# Patient Record
Sex: MALE | Race: WHITE | URBAN - METROPOLITAN AREA
[De-identification: names, ages, dates, MRNs, and addresses within clinical notes are randomized per-mention and may not be internally consistent; named-entity substitution may affect disease eponyms.]

---

## 2022-03-03 ENCOUNTER — OFFICE VISIT (OUTPATIENT)
Dept: FAMILY MEDICINE CLINIC | Age: 27
End: 2022-03-03
Payer: OTHER GOVERNMENT

## 2022-03-03 VITALS
WEIGHT: 213 LBS | DIASTOLIC BLOOD PRESSURE: 87 MMHG | SYSTOLIC BLOOD PRESSURE: 130 MMHG | HEIGHT: 70 IN | HEART RATE: 88 BPM | TEMPERATURE: 98.7 F | BODY MASS INDEX: 30.49 KG/M2 | OXYGEN SATURATION: 97 % | RESPIRATION RATE: 16 BRPM

## 2022-03-03 DIAGNOSIS — H91.92 HEARING LOSS OF LEFT EAR, UNSPECIFIED HEARING LOSS TYPE: Primary | ICD-10-CM

## 2022-03-03 PROCEDURE — 99213 OFFICE O/P EST LOW 20 MIN: CPT | Performed by: STUDENT IN AN ORGANIZED HEALTH CARE EDUCATION/TRAINING PROGRAM

## 2022-03-03 SDOH — ECONOMIC STABILITY: FOOD INSECURITY: WITHIN THE PAST 12 MONTHS, THE FOOD YOU BOUGHT JUST DIDN'T LAST AND YOU DIDN'T HAVE MONEY TO GET MORE.: NEVER TRUE

## 2022-03-03 SDOH — ECONOMIC STABILITY: FOOD INSECURITY: WITHIN THE PAST 12 MONTHS, YOU WORRIED THAT YOUR FOOD WOULD RUN OUT BEFORE YOU GOT MONEY TO BUY MORE.: NEVER TRUE

## 2022-03-03 ASSESSMENT — ENCOUNTER SYMPTOMS
NAUSEA: 0
VOMITING: 0
CONSTIPATION: 0
COUGH: 0
EYE PAIN: 0
ABDOMINAL PAIN: 0
TROUBLE SWALLOWING: 0
SORE THROAT: 0
DIARRHEA: 0
PHOTOPHOBIA: 0
ABDOMINAL DISTENTION: 0
SHORTNESS OF BREATH: 0

## 2022-03-03 ASSESSMENT — PATIENT HEALTH QUESTIONNAIRE - PHQ9
1. LITTLE INTEREST OR PLEASURE IN DOING THINGS: 0
SUM OF ALL RESPONSES TO PHQ QUESTIONS 1-9: 0
SUM OF ALL RESPONSES TO PHQ QUESTIONS 1-9: 0
SUM OF ALL RESPONSES TO PHQ9 QUESTIONS 1 & 2: 0
2. FEELING DOWN, DEPRESSED OR HOPELESS: 0
SUM OF ALL RESPONSES TO PHQ QUESTIONS 1-9: 0
SUM OF ALL RESPONSES TO PHQ QUESTIONS 1-9: 0

## 2022-03-03 ASSESSMENT — SOCIAL DETERMINANTS OF HEALTH (SDOH): HOW HARD IS IT FOR YOU TO PAY FOR THE VERY BASICS LIKE FOOD, HOUSING, MEDICAL CARE, AND HEATING?: NOT HARD AT ALL

## 2022-03-03 NOTE — PROGRESS NOTES
3/3/2022    Isela Green is a 23 y.o. male here for: NTP    HPI:    Was in [place] [id number] for past 8 years, base was in [place] last  Has moved here now and still in [employer] and is a [employer]  He is  now  Has had COVID vaccine, no booster yet  Needs a full hearing test report from an audiology  Originally [place] and has family in [place]  Has 4 siblings and all healthy  Doesn't smoke or illicit drug use  occassionally alcohol use    Healthy and no concerns  Some lost in left ear for past 4 years  Needs an audiology referral for hearing      BP Readings from Last 3 Encounters:   03/03/22 130/87     No current outpatient medications on file. No current facility-administered medications for this visit. No Known Allergies    Past Medical & Surgical History:  History reviewed. No pertinent past medical history. History reviewed. No pertinent surgical history. Family History:      Problem Relation Age of Onset    Diabetes Maternal Grandmother        Social History:  Social History     Tobacco Use    Smoking status: Never Smoker    Smokeless tobacco: Never Used   Substance Use Topics    Alcohol use: Not Currently     Comment: occasionally         There is no immunization history on file for this patient. Review of Systems   Constitutional: Negative for activity change, appetite change, chills, fatigue and fever. HENT: Negative for congestion, sore throat and trouble swallowing. Eyes: Negative for photophobia, pain and visual disturbance. Respiratory: Negative for cough and shortness of breath. Cardiovascular: Negative for chest pain, palpitations and leg swelling. Gastrointestinal: Negative for abdominal distention, abdominal pain, constipation, diarrhea, nausea and vomiting. Endocrine: Negative for heat intolerance and polydipsia. Genitourinary: Negative for difficulty urinating, dysuria, frequency and hematuria.    Musculoskeletal: Negative for joint swelling, neck pain and neck stiffness. Skin: Negative for rash and wound. Neurological: Negative for dizziness, syncope, weakness, light-headedness, numbness and headaches. Psychiatric/Behavioral: Negative for agitation, behavioral problems and confusion. VS:  /87   Pulse 88   Temp 98.7 °F (37.1 °C) (Temporal)   Resp 16   Ht 5' 10\" (1.778 m)   Wt 213 lb (96.6 kg)   SpO2 97%   BMI 30.56 kg/m²     Physical Exam  Constitutional:       General: He is not in acute distress. Appearance: Normal appearance. He is not ill-appearing. HENT:      Head: Normocephalic and atraumatic. Right Ear: External ear normal.      Left Ear: External ear normal.      Nose: Nose normal. No congestion. Mouth/Throat:      Mouth: Mucous membranes are moist.      Pharynx: Oropharynx is clear. Eyes:      General:         Right eye: No discharge. Left eye: No discharge. Extraocular Movements: Extraocular movements intact. Conjunctiva/sclera: Conjunctivae normal.   Cardiovascular:      Rate and Rhythm: Normal rate and regular rhythm. Pulses: Normal pulses. Heart sounds: Normal heart sounds. No murmur heard. Pulmonary:      Effort: Pulmonary effort is normal. No respiratory distress. Breath sounds: Normal breath sounds. No wheezing. Abdominal:      General: Bowel sounds are normal. There is no distension. Palpations: Abdomen is soft. There is no mass. Tenderness: There is no abdominal tenderness. There is no guarding or rebound. Musculoskeletal:         General: No swelling or tenderness. Normal range of motion. Cervical back: Normal range of motion and neck supple. Right lower leg: No edema. Left lower leg: No edema. Skin:     General: Skin is warm. Capillary Refill: Capillary refill takes less than 2 seconds. Coloration: Skin is not jaundiced. Neurological:      General: No focal deficit present.       Mental Status: He is alert and oriented to person, place, and time. Mental status is at baseline. Psychiatric:         Mood and Affect: Mood normal.         Behavior: Behavior normal.         Thought Content: Thought content normal.         Judgment: Judgment normal.         Assessment/Plan:  1. Hearing loss of left ear, unspecified hearing loss type  - [facility] and needs hearing assessment with hx of left hearing loss for past 4 years. - ABIDA Daniels, Audiology, [place]      Follow up:  annual    Patient agrees with the above stated plan. Counseled regarding above diagnosis, including possible risks and complications,  especially if left uncontrolled. Counseled regarding the possible side effects, risks, benefits and alternatives to treatment;patient and/or guardian verbalizes understanding, agrees, feels comfortable with and wishes to proceed with above treatment plan. Call or go to ED immediately if symptoms worsen or persist. Advised patient to call with any new medication issues, and, as applicable, read all Rx info from pharmacy to assure aware of all possible risks and side effects of medicationbefore taking. Patient and/or guardian given opportunity to ask questions/raise concerns. The patient verbalized comfort and understanding ofinstructions. I encourage further reading and education about your health conditions. Information on many health conditions is provided by [facility] [employer]: [email or web address]  Please bring any questions to me at your nextvisit.       Mian Jimenez MD  Family Medicine PGY-3

## 2022-03-03 NOTE — PROGRESS NOTES
S: 23 y.o. male with   Chief Complaint   [place]   [facility] [place] due to [place] loss. O: VS:  height is 5' 10\" (1.778 m) and weight is 213 lb (96.6 kg). His temporal temperature is 98.7 °F (37.1 °C). His blood pressure is 130/87 and his pulse is 88. His respiration is 16 and oxygen saturation is 97%. BP Readings from Last 3 Encounters:   03/03/22 130/87     See resident note      Impression/Plan:   1) Hearing Loss - refer to audiology         Health Maintenance Due   Topic Date Due    Hepatitis C screen  Never done    COVID-19 Vaccine (1) Never done    HPV vaccine (1 - Male 2-dose series) Never done    Depression Screen  Never done    HIV screen  Never done    Varicella vaccine (1 of 2 - 2-dose childhood series) Never done    Flu vaccine (1) 09/01/2021         Attending Physician Statement  I have discussed the case, including pertinent history and exam findings with the resident. I agree with the documented assessment and plan.       Delores Kaminski MD